# Patient Record
Sex: FEMALE | Race: AMERICAN INDIAN OR ALASKA NATIVE
[De-identification: names, ages, dates, MRNs, and addresses within clinical notes are randomized per-mention and may not be internally consistent; named-entity substitution may affect disease eponyms.]

---

## 2020-01-22 ENCOUNTER — HOSPITAL ENCOUNTER (OUTPATIENT)
Dept: HOSPITAL 5 - GIO | Age: 55
Discharge: HOME | End: 2020-01-22
Attending: INTERNAL MEDICINE
Payer: OTHER GOVERNMENT

## 2020-01-22 VITALS — DIASTOLIC BLOOD PRESSURE: 94 MMHG | SYSTOLIC BLOOD PRESSURE: 153 MMHG

## 2020-01-22 DIAGNOSIS — K31.89: ICD-10-CM

## 2020-01-22 DIAGNOSIS — Z80.8: ICD-10-CM

## 2020-01-22 DIAGNOSIS — Z79.899: ICD-10-CM

## 2020-01-22 DIAGNOSIS — Z12.11: Primary | ICD-10-CM

## 2020-01-22 DIAGNOSIS — Z80.0: ICD-10-CM

## 2020-01-22 DIAGNOSIS — K64.8: ICD-10-CM

## 2020-01-22 DIAGNOSIS — K62.1: ICD-10-CM

## 2020-01-22 DIAGNOSIS — I10: ICD-10-CM

## 2020-01-22 DIAGNOSIS — K57.30: ICD-10-CM

## 2020-01-22 DIAGNOSIS — Z88.5: ICD-10-CM

## 2020-01-22 DIAGNOSIS — F41.9: ICD-10-CM

## 2020-01-22 DIAGNOSIS — Z80.41: ICD-10-CM

## 2020-01-22 DIAGNOSIS — K29.50: ICD-10-CM

## 2020-01-22 DIAGNOSIS — Z72.89: ICD-10-CM

## 2020-01-22 DIAGNOSIS — G47.30: ICD-10-CM

## 2020-01-22 DIAGNOSIS — K21.0: ICD-10-CM

## 2020-01-22 PROCEDURE — 45380 COLONOSCOPY AND BIOPSY: CPT

## 2020-01-22 PROCEDURE — 88342 IMHCHEM/IMCYTCHM 1ST ANTB: CPT

## 2020-01-22 PROCEDURE — 88305 TISSUE EXAM BY PATHOLOGIST: CPT

## 2020-01-22 PROCEDURE — 43239 EGD BIOPSY SINGLE/MULTIPLE: CPT

## 2020-01-22 NOTE — OPERATIVE REPORT
PROCEDURE:  Colonoscopy with biopsy.



INDICATIONS:  This is a 54-year-old -American female with a strong family

history of cancer.  The patient's father had colon cancer and sister had a

history of ovarian cancer.  EGD was done prior to the colonoscopy because of

GERD symptoms that showed a moderate hiatal hernia, moderate erosive

esophagitis, gastritis as well as fundic gland polyp.  The patient will be

treated with PPI for that.



PROCEDURE:  Colonoscopy was done after getting informed consent with MAC

anesthesia.  After getting informed consent, initial rectal exam was

unremarkable.  Instrument was passed through the rectum onto the cecum, which

was identified by the ileocecal valve and the appendiceal orifice. 

Visualization was fair.  The cecum was examined on the retroverted view.  No

additional pathology was noted other than for the presence of a few scattered

diverticula that was noted in the cecum, ascending colon, few in the transverse

and moderate in the left colon.  The sigmoid appeared normal.  In the rectum,

there were several small polyps, possibly hyperplastic that were removed by cold

biopsy with minimal bleeding from the biopsy sites.  The rectum showed moderate

internal hemorrhoid, for which the patient will be treated and if there is

persistence, then the patient may require either banding or referral to a

colorectal surgeon.



ASSESSMENT:  Colon polyp screening, family history of cancer, multiple small

possibly hyperplastic polyps in the rectum.  Scattered diverticular disease

throughout the colon, moderate internal hemorrhoid.



PLAN:  To encourage the patient to take fiber supplements and also to treat the

patient with Anusol-HC suppositories, Sitz bath and treat the patient with PPI

because of the EGD findings of esophagitis, gastritis.  The patient will be

asked to avoid aspirin and aspirin-related products for the next few days and

follow up in the office in 1-2 weeks' time.



Procedure was done in the GI lab with assistance of the GI lab team, which

included Mckenzie LUNA; Magy thakkar and with assistance of anesthesia.





DD: 01/22/2020 10:33

DT: 01/22/2020 10:44

JOB# [id number]  [id number]

TOMAS/KERON

## 2020-01-22 NOTE — OPERATIVE REPORT
PROCEDURE:  Esophagogastroduodenoscopy with biopsy.



INDICATIONS:  This is a 54-year-old -American female who has been having

GERD symptoms.  She is also here for colonoscopy as part of colon polyp

screening since she has a strong family history of cancer.



PROCEDURE:  Esophagogastroduodenoscopy was done after getting informed consent

with MAC anesthesia.  Instrument was passed through the hypopharynx into the

esophagus, which showed moderate erosive esophagitis.  Photo documentation and

biopsy was obtained.  The stomach showed fundic gland in the gastric body, which

were biopsied as well as a moderate hiatal hernia.  There was some antral

gastritis present.  The pylorus was patent.  Duodenum in the first and second

portion appeared normal.  Additional biopsy was done from the gastric antrum,

gastric body, and angular incisura to rule out for H. pylori and atrophic

gastritis.  There was no peptic ulcer disease noted.  There was minimal bleeding

associated with the procedure.  No complications associated with the procedure.



ASSESSMENT:  Gastroesophageal reflux disease symptoms, moderate erosive

esophagitis, moderate hiatal hernia, fundic gland polyps involving the gastric

body and gastritis.  No evidence of any peptic ulcer disease noted and the

pylorus was patent.



PLAN:  Plan is to treat the patient with PPI.  The patient will also be

encouraged about lifestyle changes since she has a moderate hiatal hernia, asked

to avoid aspirin and aspirin-related products for the next few days and follow

up in the office in 1-2 weeks' time.  In addition, she is to have a colonoscopy

done as part of colon polyp screening because of a strong family history of

cancer.



The procedure was done in the GI lab with assistance of the GI lab team, which

included JEREMY Bill and with assistance of Magy aguilar and also with

the assistance of anesthesia.





DD: 01/22/2020 10:30

DT: 01/22/2020 10:40

JOB# [id number]  [id number]

TOMAS/KERON

## 2020-01-22 NOTE — PROCEDURE NOTE
Date of procedure: 01/22/20


Pre-op diagnosis: GERD/ Colon Polyp Screening/F/H/O Cancer (Father- colon 

Cancer)


Post-op diagnosis: other (Moderate,Erosive Esophagitis/Moderate,Hiatal 

Hernia/Gastritis/Gastric Fundic Glands/Moderate,Internal Hemorrhoids/Scattered, 

Diverticular disease/Few samll (possibly Hyperplastic) Rectal Polyps)


Procedure: 





EGD with biopsy and Colonoscopy with Biopsy


Anesthesia: MAC


Surgeon: DESTINY GUZMÁN


Estimated blood loss: minimal


Pathology: list


Specimen disposition: to lab


Condition: stable


Disposition: same day (Avoid aspirin and NSAID for 4 days; otherwise resume home

medication.  Treat with PPI,Anusol HC supp, Sitz Bath and follow up in 1 to 2 

weeks ([phone]).  Patient will be adviced about lifestyle changes for her 

Hiatal Hernia.)

## 2020-01-22 NOTE — HISTORY AND PHYSICAL REPORT
HISTORY OF PRESENT ILLNESS:  This is a 54-year-old -American female with

an underlying history of hypertension and a strong family history of cancer. 

The patient's sister had ovarian cancer.  Father had colon cancer and mother

also has a history of multiple sclerosis.  Her last colonoscopy was about 4-5

years back, back in 2015, she also has been having GERD symptoms and is to have

an EGD as well as a colonoscopy done at [facility] on

01/22/2020.  The patient has used the [facility].



MEDICATIONS:  She normally uses include lisinopril and hydrochlorothiazide.



SOCIAL HISTORY:  She denies any history of smoking.  Admits to drinking alcohol

on occasion.  No cardiac issues.  No flu shots.



ALLERGIES:  SHE HAS A HISTORY OF ALLERGY TO MORPHINE.



PHYSICAL EXAMINATION:

VITAL SIGNS:  She is afebrile.  Height is 5'6

pressure is 152/94, pulse is 79.

HEENT:  Shows no JVD.

LUNGS:  Clear to auscultation.

CARDIOVASCULAR:  Normal.

ABDOMEN:  Soft.  Bowel sounds present.

NEUROLOGIC:  The patient is otherwise alert and oriented.



ASSESSMENT:  Colon polyp screening, family history of cancer, gastroesophageal

reflux disease symptoms, esophagitis, hypertension.



PLAN:  To do an EGD and a colonoscopy at [facility] on

01/22/2020.  The patient is to use the [facility].





DD: 01/22/2020 09:50

DT: 01/22/2020 09:55

JOB# [id number]  [id number]

TOMAS/KERON

## 2020-01-22 NOTE — ANESTHESIA CONSULTATION
Anesthesia Consult and Med Hx


Date of service: 01/22/20





- Airway


Anesthetic Teeth Evaluation: Bridges


ROM Head & Neck: Adequate


Mental/Hyoid Distance: Adequate


Mallampati Class: Class II


Intubation Access Assessment: Probably Good





- Pre-Operative Health Status


ASA Pre-Surgery Classification: ASA2


Proposed Anesthetic Plan: MAC





- Pulmonary


Hx Smoking: Yes (past)


Hx Asthma: No


Hx Respiratory Symptoms: No


SOB: No


COPD: No


Home Oxygen Therapy: No


Hx Pneumonia: No


Hx Sleep Apnea: Yes





- Cardiovascular System


Hx Hypertension: Yes


Hx Coronary Artery Disease: No


Hx Heart Attack/AMI: No


Hx Angina: No


Hx Percutaneous Transluminal Coronary Angioplasty (PTCA): No


Hx Cardia Arrhythmia: No


Hx Pacemaker: No


Hx Internal Defibrillator: No


Hx Valvular Heart Disease: No


Hx Heart Murmur: No


Hx Peripheral Vascular Disease: No





- Central Nervous System


Hx Neuromuscular Disorder: No


Hx Seizures: No


CVA: No


Hx Back Pain: Yes (takes motrin)


Hx Psychiatric Problems: Yes (anexity)





- Gastrointestinal


Hx Ulcer: No


Hx Gastroesophageal Reflux Disease: Yes





- Endocrine


Hx Renal Disease: No


Hx End Stage Renal Disease: No


Hx Cirrhosis: No


Hx Liver Disease: No


Hx Insulin Dependent Diabetes: No


Hx Non-Insulin Dependent Diabetes: No


Hx Thyroid Disease: No


Hx Hypothyroidism: No


Hx Hyperthyroidism: No





- Hematic


Hx Anemia: No


Hx Sickle Cell Disease: No





- Other Systems


Hx Alcohol Use: Yes (occ.)


Hx Substance Use: No


Hx Cancer: No


Hx Obesity: No